# Patient Record
Sex: FEMALE | Race: WHITE | Employment: OTHER | URBAN - METROPOLITAN AREA
[De-identification: names, ages, dates, MRNs, and addresses within clinical notes are randomized per-mention and may not be internally consistent; named-entity substitution may affect disease eponyms.]

---

## 2017-01-03 DIAGNOSIS — F41.9 ANXIETY: ICD-10-CM

## 2017-01-03 DIAGNOSIS — M79.7 FIBROMYALGIA: ICD-10-CM

## 2017-01-05 RX ORDER — GABAPENTIN 300 MG/1
300 CAPSULE ORAL 2 TIMES DAILY
Qty: 180 CAPSULE | Refills: 0 | Status: ON HOLD | OUTPATIENT
Start: 2017-01-05 | End: 2017-08-27 | Stop reason: HOSPADM

## 2017-01-05 RX ORDER — SERTRALINE HYDROCHLORIDE 100 MG/1
100 TABLET, FILM COATED ORAL DAILY
Qty: 90 TABLET | Refills: 0 | Status: ON HOLD | OUTPATIENT
Start: 2017-01-05 | End: 2017-08-27 | Stop reason: HOSPADM

## 2017-01-05 RX ORDER — ALPRAZOLAM 0.5 MG/1
0.5 TABLET ORAL 2 TIMES DAILY
Qty: 180 TABLET | Refills: 0 | Status: ON HOLD | OUTPATIENT
Start: 2017-01-05 | End: 2017-08-27 | Stop reason: HOSPADM

## 2017-02-27 ENCOUNTER — HOSPITAL ENCOUNTER (OUTPATIENT)
Dept: GENERAL RADIOLOGY | Age: 62
Discharge: HOME | End: 2017-02-27
Attending: FAMILY MEDICINE | Admitting: FAMILY MEDICINE

## 2017-02-27 ENCOUNTER — OFFICE VISIT (OUTPATIENT)
Dept: FAMILY MEDICINE CLINIC | Age: 62
End: 2017-02-27

## 2017-02-27 VITALS
WEIGHT: 95.4 LBS | HEIGHT: 61 IN | TEMPERATURE: 97.9 F | BODY MASS INDEX: 18.01 KG/M2 | HEART RATE: 81 BPM | DIASTOLIC BLOOD PRESSURE: 72 MMHG | OXYGEN SATURATION: 98 % | SYSTOLIC BLOOD PRESSURE: 128 MMHG

## 2017-02-27 DIAGNOSIS — J20.9 ACUTE BRONCHITIS, UNSPECIFIED ORGANISM: Primary | ICD-10-CM

## 2017-02-27 DIAGNOSIS — M25.572 ACUTE LEFT ANKLE PAIN: ICD-10-CM

## 2017-02-27 DIAGNOSIS — S96.912D LEFT ANKLE STRAIN, SUBSEQUENT ENCOUNTER: Primary | ICD-10-CM

## 2017-02-27 PROCEDURE — 99214 OFFICE O/P EST MOD 30 MIN: CPT | Performed by: FAMILY MEDICINE

## 2017-02-27 RX ORDER — AMOXICILLIN AND CLAVULANATE POTASSIUM 875; 125 MG/1; MG/1
1 TABLET, FILM COATED ORAL 2 TIMES DAILY
Qty: 14 TABLET | Refills: 0 | Status: ON HOLD | OUTPATIENT
Start: 2017-02-27 | End: 2017-08-27 | Stop reason: HOSPADM

## 2017-02-27 RX ORDER — PREDNISONE 20 MG/1
20 TABLET ORAL 2 TIMES DAILY
Qty: 10 TABLET | Refills: 0 | Status: SHIPPED | OUTPATIENT
Start: 2017-02-27 | End: 2017-03-04

## 2017-02-27 ASSESSMENT — ENCOUNTER SYMPTOMS
SHORTNESS OF BREATH: 0
COUGH: 1
SORE THROAT: 0
SINUS PRESSURE: 0
BLOOD IN STOOL: 0
BACK PAIN: 0
DIARRHEA: 0
NAUSEA: 0
EYE DISCHARGE: 0
VOMITING: 0

## 2017-03-01 ENCOUNTER — TELEPHONE (OUTPATIENT)
Dept: FAMILY MEDICINE CLINIC | Age: 62
End: 2017-03-01

## 2017-03-06 ENCOUNTER — TELEPHONE (OUTPATIENT)
Dept: FAMILY MEDICINE CLINIC | Age: 62
End: 2017-03-06

## 2017-03-09 ENCOUNTER — HOSPITAL ENCOUNTER (OUTPATIENT)
Dept: PHYSICAL THERAPY | Age: 62
Discharge: HOME | End: 2017-03-31
Attending: FAMILY MEDICINE | Admitting: FAMILY MEDICINE

## 2017-03-09 ASSESSMENT — PAIN DESCRIPTION - ORIENTATION: ORIENTATION: RIGHT

## 2017-03-09 ASSESSMENT — PAIN DESCRIPTION - FREQUENCY: FREQUENCY: CONTINUOUS

## 2017-03-09 ASSESSMENT — PAIN DESCRIPTION - PAIN TYPE: TYPE: CHRONIC PAIN

## 2017-03-09 ASSESSMENT — PAIN DESCRIPTION - PROGRESSION: CLINICAL_PROGRESSION: GRADUALLY WORSENING

## 2017-03-09 ASSESSMENT — PAIN SCALES - GENERAL: PAINLEVEL_OUTOF10: 5

## 2017-03-09 ASSESSMENT — PAIN DESCRIPTION - DESCRIPTORS: DESCRIPTORS: SHARP

## 2017-04-01 ENCOUNTER — HOSPITAL ENCOUNTER (OUTPATIENT)
Dept: OTHER | Age: 62
Discharge: HOME | End: 2017-04-30
Attending: FAMILY MEDICINE | Admitting: FAMILY MEDICINE

## 2017-04-21 ENCOUNTER — HOSPITAL ENCOUNTER (OUTPATIENT)
Dept: PHYSICAL THERAPY | Age: 62
Discharge: HOME | End: 2017-04-30
Attending: PSYCHIATRY & NEUROLOGY | Admitting: PSYCHIATRY & NEUROLOGY

## 2017-04-21 ASSESSMENT — PAIN DESCRIPTION - PAIN TYPE: TYPE: CHRONIC PAIN

## 2017-04-21 ASSESSMENT — PAIN DESCRIPTION - DESCRIPTORS: DESCRIPTORS: BURNING;ACHING

## 2017-04-21 ASSESSMENT — PAIN DESCRIPTION - FREQUENCY: FREQUENCY: CONTINUOUS

## 2017-04-21 ASSESSMENT — PAIN DESCRIPTION - ORIENTATION: ORIENTATION: LEFT

## 2017-04-21 ASSESSMENT — PAIN DESCRIPTION - ONSET: ONSET: ON-GOING

## 2017-05-01 ENCOUNTER — HOSPITAL ENCOUNTER (OUTPATIENT)
Dept: OTHER | Age: 62
Discharge: HOME | End: 2017-05-31
Attending: PSYCHIATRY & NEUROLOGY | Admitting: PSYCHIATRY & NEUROLOGY

## 2017-06-01 ENCOUNTER — HOSPITAL ENCOUNTER (OUTPATIENT)
Dept: OTHER | Age: 62
Discharge: HOME | End: 2017-06-30
Attending: PSYCHIATRY & NEUROLOGY | Admitting: PSYCHIATRY & NEUROLOGY

## 2018-10-17 ENCOUNTER — HOSPITAL ENCOUNTER (OUTPATIENT)
Dept: WOMENS IMAGING | Age: 63
Discharge: HOME | End: 2018-10-17
Payer: MEDICARE

## 2018-10-17 DIAGNOSIS — Z12.31 ENCOUNTER FOR SCREENING MAMMOGRAM FOR BREAST CANCER: ICD-10-CM

## 2018-10-17 DIAGNOSIS — M81.0 SENILE OSTEOPOROSIS: ICD-10-CM

## 2018-10-17 PROCEDURE — 77067 SCR MAMMO BI INCL CAD: CPT

## 2018-10-17 PROCEDURE — 77080 DXA BONE DENSITY AXIAL: CPT

## 2022-01-03 ENCOUNTER — INITIAL CONSULT (OUTPATIENT)
Dept: OBGYN | Age: 67
End: 2022-01-03
Payer: MEDICARE

## 2022-01-03 VITALS
HEIGHT: 61 IN | DIASTOLIC BLOOD PRESSURE: 74 MMHG | SYSTOLIC BLOOD PRESSURE: 141 MMHG | WEIGHT: 90 LBS | BODY MASS INDEX: 16.99 KG/M2

## 2022-01-03 DIAGNOSIS — R10.2 PELVIC PAIN: ICD-10-CM

## 2022-01-03 DIAGNOSIS — Z01.419 WOMEN'S ANNUAL ROUTINE GYNECOLOGICAL EXAMINATION: Primary | ICD-10-CM

## 2022-01-03 LAB
BILIRUBIN URINE: NEGATIVE
BLOOD, URINE: NEGATIVE
CLARITY: ABNORMAL
COLOR: YELLOW
EPITHELIAL CELLS, UA: 1 /HPF (ref 0–5)
GLUCOSE URINE: NEGATIVE MG/DL
HYALINE CASTS: 1 /LPF (ref 0–8)
KETONES, URINE: NEGATIVE MG/DL
LEUKOCYTE ESTERASE, URINE: NEGATIVE
MICROSCOPIC EXAMINATION: YES
NITRITE, URINE: NEGATIVE
PH UA: 7.5 (ref 5–8)
PROTEIN UA: NEGATIVE MG/DL
RBC UA: 2 /HPF (ref 0–4)
SPECIFIC GRAVITY UA: 1.02 (ref 1–1.03)
URINE TYPE: ABNORMAL
UROBILINOGEN, URINE: 0.2 E.U./DL
WBC UA: 0 /HPF (ref 0–5)

## 2022-01-03 PROCEDURE — G8484 FLU IMMUNIZE NO ADMIN: HCPCS | Performed by: OBSTETRICS & GYNECOLOGY

## 2022-01-03 PROCEDURE — G0101 CA SCREEN;PELVIC/BREAST EXAM: HCPCS | Performed by: OBSTETRICS & GYNECOLOGY

## 2022-01-03 RX ORDER — QUETIAPINE FUMARATE 25 MG/1
25 TABLET, FILM COATED ORAL 2 TIMES DAILY
COMMUNITY

## 2022-01-03 SDOH — ECONOMIC STABILITY: FOOD INSECURITY: WITHIN THE PAST 12 MONTHS, YOU WORRIED THAT YOUR FOOD WOULD RUN OUT BEFORE YOU GOT MONEY TO BUY MORE.: NEVER TRUE

## 2022-01-03 SDOH — ECONOMIC STABILITY: FOOD INSECURITY: WITHIN THE PAST 12 MONTHS, THE FOOD YOU BOUGHT JUST DIDN'T LAST AND YOU DIDN'T HAVE MONEY TO GET MORE.: NEVER TRUE

## 2022-01-03 ASSESSMENT — PATIENT HEALTH QUESTIONNAIRE - PHQ9
SUM OF ALL RESPONSES TO PHQ9 QUESTIONS 1 & 2: 1
2. FEELING DOWN, DEPRESSED OR HOPELESS: 0
1. LITTLE INTEREST OR PLEASURE IN DOING THINGS: 1
SUM OF ALL RESPONSES TO PHQ QUESTIONS 1-9: 1

## 2022-01-03 ASSESSMENT — SOCIAL DETERMINANTS OF HEALTH (SDOH): HOW HARD IS IT FOR YOU TO PAY FOR THE VERY BASICS LIKE FOOD, HOUSING, MEDICAL CARE, AND HEATING?: NOT HARD AT ALL

## 2022-01-03 NOTE — PROGRESS NOTES
1/3/22    Nancy Ha  1955    Chief Complaint   Patient presents with    Gynecologic Exam     pt here for annual, postmenopausal, had hyster, has both ovaries, not taking HRT, last pap- 2018-normal, mammo-10/17/18-normal, dexa- 10/17/18- osteoporosis-taking vitamin d and calcium, colonoscopy- unsure.  Pelvic Pain     pt here for consult for pelvic and pressure x 3 months, sharp,dull pressure, does not radiate, nothing worsens the pain. Roxana Lamb is a 74 y.o. female who presents today for evaluation of pelvic pain    Past Medical History:   Diagnosis Date    Anxiety     Chest pain     Compression fracture     COPD [facility]ch[facility] obstructive [facility] disease) ([facility])     Depression     Dizziness     Fibromyalgia     Fibromyalgia     H/O cardiovascular stress test 03/10/2014    EF 70%, normal pattern of perfusion in all regions, no ischemia, no wall motion abnormality seen, exercise capacity 10.0 METS.    H/O Doppler ultrasound 3/10/14, 11/5/2010    CAROTID US-3/14-No significant atheroscerotic disease. Normal flows. 11/5/10-no signif stenosis    H/O echocardiogram 3/10/14    3/14-Normal chamber size. Normal LV function. Mild MR and TR. EF >55%.  History of chest pain     occasional, per PCP notes dated 2/17/2014    Hyperlipidemia     Insomnia     Osteoporosis     Pelvic pain     Pelvic pressure in female     RLS (restless legs syndrome)     Smoker     Tobacco use     Vitamin D deficiency        Past Surgical History:   Procedure Laterality Date    BREAST LUMPECTOMY      lt breat    COLONOSCOPY  12/3/2010    Dr Ciara Kasper         Social History     Tobacco Use    Smoking status: Former Smoker     Packs/day: 1.50     Types: Cigarettes     Start date: 1/1/1974    Smokeless tobacco: Never Used   Vaping Use    Vaping Use: Every day    Substances: Nicotine, THC   Substance Use Topics    Alcohol use: No    Drug use:  Yes Types: Marijuana Naun Cristoferpaolo)       Family History   Problem Relation Age of Onset    Cancer Mother         lymphoma, lung, & brain cancer    Diabetes Mother     Heart Disease Father     Heart Surgery Father     High Cholesterol Father     Hypertension Father     Heart Disease Brother         Valvular heart disease.  Diabetes Brother     Heart Disease Brother        Current Outpatient Medications   Medication Sig Dispense Refill    QUEtiapine (SEROQUEL) 25 MG tablet Take 25 mg by mouth 2 times daily      albuterol sulfate  (90 BASE) MCG/ACT inhaler Inhale 2 puffs into the lungs every 6 hours as needed for Wheezing Per PCP med list 1 Inhaler 5    fluticasone (FLONASE) 50 MCG/ACT nasal spray 2 sprays by Nasal route every evening. 2 Sprays into each nostril at bedtime. Per PCP med list      budesonide-formoterol (SYMBICORT) 160-4.5 MCG/ACT AERO Inhale 1 puff into the lungs 2 times daily. Per PCP med list      Cholecalciferol (VITAMIN D) 2000 UNITS CAPS capsule Take 1 capsule by mouth daily. Per PCP med list dated 2/18/2014      sertraline (ZOLOFT) 50 MG tablet Take 1 tablet by mouth daily Per PCP med list (Patient not taking: Reported on 1/3/2022) 90 tablet 0    vitamin D (ERGOCALCIFEROL) 76444 UNITS CAPS capsule Take 1 capsule by mouth once a week (Patient not taking: Reported on 1/3/2022) 12 capsule 1    eszopiclone (LUNESTA) 3 MG TABS Take 1 tablet by mouth nightly (Patient not taking: Reported on 1/3/2022) 30 tablet 2    mometasone-formoterol (DULERA) 200-5 MCG/ACT inhaler Inhale 2 puffs into the lungs every 12 hours (Patient not taking: Reported on 1/3/2022) 1 Inhaler 5    tiotropium (SPIRIVA HANDIHALER) 18 MCG inhalation capsule Inhale 1 capsule into the lungs daily (Patient not taking: Reported on 1/3/2022) 30 capsule 3    aspirin 81 MG tablet Take 81 mg by mouth daily. (Patient not taking: Reported on 1/3/2022)       No current facility-administered medications for this visit. No Known Allergies          There is no immunization history on file for this patient. Review of Systems  All other systems reviewed and are negative    BP (!) 141/74   Ht 5' 1\" (1.549 m)   Wt 90 lb (40.8 kg)   BMI 17.01 kg/m²     Physical Exam  Nursing note reviewed. Exam conducted with a chaperone present. HENT:      Head: Normocephalic. Nose: Nose normal.   Eyes:      Extraocular Movements: Extraocular movements intact. Pulmonary:      Effort: Pulmonary effort is normal.   Abdominal:      General: Abdomen is flat. Palpations: Abdomen is soft. Hernia: There is no hernia in the left inguinal area or right inguinal area. Genitourinary:     General: Normal vulva. Exam position: Lithotomy position. Pubic Area: No rash or pubic lice. Labia:         Right: No rash, tenderness, lesion or injury. Left: No rash, tenderness, lesion or injury. Urethra: No prolapse, urethral pain, urethral swelling or urethral lesion. Vagina: Normal.      Cervix: Normal.      Uterus: Normal.       Adnexa: Right adnexa normal and left adnexa normal.      Rectum: Normal.   Musculoskeletal:      Cervical back: Normal range of motion. Lymphadenopathy:      Lower Body: No right inguinal adenopathy. No left inguinal adenopathy. Skin:     General: Skin is warm. Neurological:      Mental Status: She is alert. Uterus and cervix surgically absent    No results found for this visit on 22. ASSESSMENT AND PLAN   Diagnosis Orders   1. Women's annual routine gynecological examination  DEXA BONE DENSITY AXIAL SKELETON    YAQUELIN DIGITAL SCREEN W OR WO CAD BILATERAL   2. Pelvic pain  US NON OB TRANSVAGINAL       Return for ultrasound, follow up appointment.     Robb Knowles MD

## 2022-01-05 LAB
ORGANISM: ABNORMAL
URINE CULTURE, ROUTINE: ABNORMAL

## 2022-01-05 RX ORDER — AMOXICILLIN 500 MG/1
500 CAPSULE ORAL 3 TIMES DAILY
Qty: 21 CAPSULE | Refills: 0 | Status: SHIPPED | OUTPATIENT
Start: 2022-01-05 | End: 2022-01-12

## 2022-01-12 ENCOUNTER — OFFICE VISIT (OUTPATIENT)
Dept: OBGYN | Age: 67
End: 2022-01-12
Payer: MEDICARE

## 2022-01-12 DIAGNOSIS — R10.2 PELVIC PAIN: Primary | ICD-10-CM

## 2022-01-12 PROCEDURE — 99213 OFFICE O/P EST LOW 20 MIN: CPT | Performed by: OBSTETRICS & GYNECOLOGY

## 2022-01-12 NOTE — PROGRESS NOTES
1/12/22    Nany Ha  1955    Chief Complaint   Patient presents with    Follow-up     pt had u/s due to pelvic pain. ns    Pelvic Pain     states she is still having pain in left pelvic area and lower back . steve Lamb is a 74 y.o. female who presents today for evaluation of pelvic pain. Past Medical History:   Diagnosis Date    Anxiety     Chest pain     Compression fracture     COPD (chronic obstructive pulmonary disease) ([facility])     Depression     Dizziness     Fibromyalgia     Fibromyalgia     H/O cardiovascular stress test 03/10/2014    EF 70%, normal pattern of perfusion in all regions, no ischemia, no wall motion abnormality seen, exercise capacity 10.0 METS.    H/O Doppler ultrasound 3/10/14, 11/5/2010    CAROTID US-3/14-No significant atheroscerotic disease. Normal flows. 11/5/10-no signif stenosis    H/O echocardiogram 3/10/14    3/14-Normal chamber size. Normal LV function. Mild MR and TR. EF >55%.      History of chest pain     occasional, per PCP notes dated 2/17/2014    Hyperlipidemia     Insomnia     Osteoporosis     Pelvic pain     Pelvic pressure in female     RLS (restless legs syndrome)     Smoker     Tobacco use     Vitamin D deficiency        Past Surgical History:   Procedure Laterality Date    BREAST LUMPECTOMY      lt breat    COLONOSCOPY  12/3/2010    Dr Ciara Kasper         Social History     Tobacco Use    Smoking status: Former Smoker     Packs/day: 1.50     Types: Cigarettes     Start date: 1/1/1974    Smokeless tobacco: Never Used   Vaping Use    Vaping Use: Every day    Substances: Nicotine, THC   Substance Use Topics    Alcohol use: No    Drug use: Yes     Types: Marijuana Veldon Bunting)       Family History   Problem Relation Age of Onset    Cancer Mother         lymphoma, lung, & brain cancer    Diabetes Mother     Heart Disease Father     Heart Surgery Father     High Cholesterol Father     Hypertension Father     Heart Disease Brother         Valvular heart disease.  Diabetes Brother     Heart Disease Brother        Current Outpatient Medications   Medication Sig Dispense Refill    amoxicillin (AMOXIL) 500 MG capsule Take 1 capsule by mouth 3 times daily for 7 days 21 capsule 0    QUEtiapine (SEROQUEL) 25 MG tablet Take 25 mg by mouth 2 times daily      albuterol sulfate  (90 BASE) MCG/ACT inhaler Inhale 2 puffs into the lungs every 6 hours as needed for Wheezing Per PCP med list 1 Inhaler 5    fluticasone (FLONASE) 50 MCG/ACT nasal spray 2 sprays by Nasal route every evening. 2 Sprays into each nostril at bedtime. Per PCP med list      budesonide-formoterol (SYMBICORT) 160-4.5 MCG/ACT AERO Inhale 1 puff into the lungs 2 times daily. Per PCP med list      Cholecalciferol (VITAMIN D) 2000 UNITS CAPS capsule Take 1 capsule by mouth daily. Per PCP med list dated 2014      sertraline (ZOLOFT) 50 MG tablet Take 1 tablet by mouth daily Per PCP med list (Patient not taking: Reported on 2022) 90 tablet 0    vitamin D (ERGOCALCIFEROL) 20858 UNITS CAPS capsule Take 1 capsule by mouth once a week (Patient not taking: Reported on 1/3/2022) 12 capsule 1    eszopiclone (LUNESTA) 3 MG TABS Take 1 tablet by mouth nightly (Patient not taking: Reported on 1/3/2022) 30 tablet 2    mometasone-formoterol (DULERA) 200-5 MCG/ACT inhaler Inhale 2 puffs into the lungs every 12 hours (Patient not taking: Reported on 1/3/2022) 1 Inhaler 5    tiotropium (SPIRIVA HANDIHALER) 18 MCG inhalation capsule Inhale 1 capsule into the lungs daily (Patient not taking: Reported on 1/3/2022) 30 capsule 3    aspirin 81 MG tablet Take 81 mg by mouth daily. (Patient not taking: Reported on 1/3/2022)       No current facility-administered medications for this visit. No Known Allergies          There is no immunization history on file for this patient.     Review of Systems  All other systems reviewed and are negative    There were no vitals taken for this visit. Physical Exam    No results found for this visit on 01/12/22. ASSESSMENT AND PLAN   Diagnosis Orders   1. Pelvic pain     Options were presented to the patient. Pain is predominantly on the left-hand side. I recommended nonsteroidals or Tylenol for the pain. Return for Will call if desires laparoscopy. Silvia Zapata MD

## 2022-01-24 DIAGNOSIS — Z01.419 WOMEN'S ANNUAL ROUTINE GYNECOLOGICAL EXAMINATION: ICD-10-CM

## 2023-07-02 ENCOUNTER — APPOINTMENT (OUTPATIENT)
Dept: CT IMAGING | Age: 68
End: 2023-07-02
Payer: MEDICARE

## 2023-07-02 ENCOUNTER — HOSPITAL ENCOUNTER (EMERGENCY)
Age: 68
Discharge: HOME | End: 2023-07-02
Attending: EMERGENCY MEDICINE
Payer: MEDICARE

## 2023-07-02 VITALS
OXYGEN SATURATION: 97 % | HEIGHT: 62 IN | TEMPERATURE: 98.1 F | WEIGHT: 101.6 LBS | DIASTOLIC BLOOD PRESSURE: 68 MMHG | SYSTOLIC BLOOD PRESSURE: 150 MMHG | RESPIRATION RATE: 18 BRPM | BODY MASS INDEX: 18.69 KG/M2 | HEART RATE: 68 BPM

## 2023-07-02 DIAGNOSIS — R35.0 URINARY FREQUENCY: Primary | ICD-10-CM

## 2023-07-02 LAB
BACTERIA: NEGATIVE /HPF
BILIRUBIN URINE: NEGATIVE MG/DL
BLOOD, URINE: NEGATIVE
CAST TYPE: NORMAL /HPF
CLARITY: CLEAR
COLOR: YELLOW
COMMENT UA: NORMAL
COMMENT UA: NORMAL
CRYSTAL TYPE: NEGATIVE /HPF
EPITHELIAL CELLS, UA: 2 /HPF
GLUCOSE, URINE: NEGATIVE MG/DL
KETONES, URINE: NEGATIVE MG/DL
LEUKOCYTE ESTERASE, URINE: NEGATIVE
NITRITE URINE, QUANTITATIVE: NEGATIVE
PH, URINE: 6.5 (ref 5–8)
PROTEIN UA: NEGATIVE MG/DL
RBC URINE: 1 /HPF (ref 0–6)
SPECIFIC GRAVITY UA: 1.02 (ref 1–1.03)
UROBILINOGEN, URINE: 0.2 MG/DL (ref 0.2–1)
WBC UA: 1 /HPF (ref 0–5)

## 2023-07-02 PROCEDURE — 99283 EMERGENCY DEPT VISIT LOW MDM: CPT

## 2023-07-02 PROCEDURE — 81003 URINALYSIS AUTO W/O SCOPE: CPT

## 2023-07-02 PROCEDURE — 87086 URINE CULTURE/COLONY COUNT: CPT

## 2023-07-02 PROCEDURE — 81001 URINALYSIS AUTO W/SCOPE: CPT

## 2023-07-02 ASSESSMENT — PAIN DESCRIPTION - ONSET: ONSET: ON-GOING

## 2023-07-02 ASSESSMENT — PAIN DESCRIPTION - ORIENTATION: ORIENTATION: MID;LOWER

## 2023-07-02 ASSESSMENT — PAIN - FUNCTIONAL ASSESSMENT: PAIN_FUNCTIONAL_ASSESSMENT: 0-10

## 2023-07-02 ASSESSMENT — PAIN SCALES - GENERAL: PAINLEVEL_OUTOF10: 4

## 2023-07-02 ASSESSMENT — PAIN DESCRIPTION - PAIN TYPE: TYPE: ACUTE PAIN

## 2023-07-02 ASSESSMENT — PAIN DESCRIPTION - LOCATION: LOCATION: BACK;ABDOMEN

## 2023-07-02 ASSESSMENT — PAIN DESCRIPTION - FREQUENCY: FREQUENCY: CONTINUOUS

## 2023-07-05 LAB
CULTURE: ABNORMAL
CULTURE: ABNORMAL
Lab: ABNORMAL
SPECIMEN: ABNORMAL